# Patient Record
Sex: MALE | Race: BLACK OR AFRICAN AMERICAN | NOT HISPANIC OR LATINO | Employment: FULL TIME | URBAN - NONMETROPOLITAN AREA
[De-identification: names, ages, dates, MRNs, and addresses within clinical notes are randomized per-mention and may not be internally consistent; named-entity substitution may affect disease eponyms.]

---

## 2021-11-10 ENCOUNTER — OFFICE VISIT (OUTPATIENT)
Dept: FAMILY MEDICINE | Facility: CLINIC | Age: 46
End: 2021-11-10

## 2021-11-10 VITALS
SYSTOLIC BLOOD PRESSURE: 137 MMHG | HEART RATE: 69 BPM | WEIGHT: 213 LBS | RESPIRATION RATE: 16 BRPM | TEMPERATURE: 98 F | DIASTOLIC BLOOD PRESSURE: 74 MMHG | OXYGEN SATURATION: 99 % | HEIGHT: 71 IN | BODY MASS INDEX: 29.82 KG/M2

## 2021-11-10 DIAGNOSIS — R05.9 COUGH: Primary | ICD-10-CM

## 2021-11-10 PROCEDURE — 99203 OFFICE O/P NEW LOW 30 MIN: CPT | Mod: ,,, | Performed by: NURSE PRACTITIONER

## 2021-11-10 PROCEDURE — 99203 PR OFFICE/OUTPT VISIT, NEW, LEVL III, 30-44 MIN: ICD-10-PCS | Mod: ,,, | Performed by: NURSE PRACTITIONER

## 2021-11-10 RX ORDER — BENZONATATE 100 MG/1
100 CAPSULE ORAL 3 TIMES DAILY PRN
Qty: 90 CAPSULE | Refills: 2 | Status: SHIPPED | OUTPATIENT
Start: 2021-11-10 | End: 2021-11-20

## 2021-11-10 RX ORDER — ALBUTEROL SULFATE 90 UG/1
2 AEROSOL, METERED RESPIRATORY (INHALATION) EVERY 6 HOURS PRN
Qty: 18 G | Refills: 0 | Status: SHIPPED | OUTPATIENT
Start: 2021-11-10 | End: 2022-11-10

## 2024-09-25 ENCOUNTER — OFFICE VISIT (OUTPATIENT)
Dept: FAMILY MEDICINE | Facility: CLINIC | Age: 49
End: 2024-09-25

## 2024-09-25 VITALS
HEART RATE: 52 BPM | BODY MASS INDEX: 32.9 KG/M2 | WEIGHT: 235 LBS | RESPIRATION RATE: 18 BRPM | TEMPERATURE: 99 F | OXYGEN SATURATION: 98 % | HEIGHT: 71 IN | DIASTOLIC BLOOD PRESSURE: 92 MMHG | SYSTOLIC BLOOD PRESSURE: 141 MMHG

## 2024-09-25 DIAGNOSIS — R03.0 ELEVATED BLOOD PRESSURE READING WITHOUT DIAGNOSIS OF HYPERTENSION: ICD-10-CM

## 2024-09-25 DIAGNOSIS — Z72.51 HIGH RISK HETEROSEXUAL BEHAVIOR: Primary | ICD-10-CM

## 2024-09-25 DIAGNOSIS — Z20.2 EXPOSURE TO TRICHOMONAS: ICD-10-CM

## 2024-09-25 PROCEDURE — 99214 OFFICE O/P EST MOD 30 MIN: CPT | Mod: ,,, | Performed by: NURSE PRACTITIONER

## 2024-09-25 RX ORDER — METRONIDAZOLE 500 MG/1
500 TABLET ORAL EVERY 8 HOURS
Qty: 21 TABLET | Refills: 0 | Status: SHIPPED | OUTPATIENT
Start: 2024-09-25

## 2024-09-25 NOTE — PROGRESS NOTES
Jeannie Dumont DNP, FNP    [address] [place] [address]     PATIENT NAME: Valentin Guevara  : 1975  DATE: 24  MRN: [id number]      Billing Provider: Jeannie Dumont DNP, FNP  Level of Service:   Patient PCP Information       Provider PCP Type    [facility]            Reason for Visit / Chief Complaint: Exposure to STD (He says he has had a sexual partner that has been diagnosed recently with Trich and he says he needs treatment.  He denies symptoms)       Update PCP  Update Chief Complaint         History of Present Illness / Problem Focused Workflow     Valentin Guevara presents to the clinic with Exposure to STD (He says he has had a sexual partner that has been diagnosed recently with Trich and he says he needs treatment.  He denies symptoms)     Exposure to STD  Pertinent negatives include no abdominal pain, chest pain, chills, constipation, coughing, diarrhea, dysuria, fever, headaches, nausea, rash, shortness of breath, sore throat or vomiting.       Review of Systems     Review of Systems   Constitutional:  Negative for activity change, appetite change, chills, fatigue and fever.   HENT:  Negative for nasal congestion, ear pain, hearing loss, postnasal drip and sore throat.    Respiratory:  Negative for cough, chest tightness, shortness of breath and wheezing.    Cardiovascular:  Negative for chest pain, palpitations, leg swelling and claudication.   Gastrointestinal:  Negative for abdominal pain, change in bowel habit, constipation, diarrhea, nausea and vomiting.   Genitourinary:  Negative for dysuria.   Musculoskeletal:  Negative for arthralgias, back pain, gait problem and myalgias.   Integumentary:  Negative for rash.   Neurological:  Negative for weakness and headaches.   Psychiatric/Behavioral:  Negative for suicidal ideas. The patient is not nervous/anxious.         Medical / Social / Family History   History reviewed. No pertinent  "past medical history.    Past Surgical History:   Procedure Laterality Date    NO PAST SURGERIES         Social History  Mr. Valentin Guevara  reports that he has been smoking cigarettes. He started smoking about 27 years ago. He has a 6.9 pack-year smoking history. He has never been exposed to tobacco smoke. He has never used smokeless tobacco. He reports current alcohol use of about 6.0 standard drinks of alcohol per week. He reports that he does not use drugs.    Family History  Mr. Valentin Guevara's family history is not on file.    Medications and Allergies     Medications  No outpatient medications have been marked as taking for the 9/25/24 encounter (Office Visit) with Jeannie Dumont, RADHA, FNP.       Allergies  Review of patient's allergies indicates:  No Known Allergies    Physical Examination     Vitals:    09/25/24 1331 09/25/24 1333 09/25/24 1350   BP: (!) 152/92 (!) 136/92 (!) 141/92  Comment: pat to monitor at home and report prn   BP Location: Left arm Right arm Right arm   Patient Position: Sitting Sitting Sitting   BP Method: Large (Automatic) Large (Automatic) Large (Automatic)   Pulse: (!) 52     Resp: 18     Temp: 98.8 °F (37.1 °C)     TempSrc: Oral     SpO2: 98%     Weight: 106.6 kg (235 lb)     Height: 5' 11" (1.803 m)       Physical Exam     Assessment and Plan (including Health Maintenance)      Problem List  Smart Sets  Document Outside HM   :    Plan:         Health Maintenance Due   Topic Date Due    Hepatitis C Screening  Never done    Lipid Panel  Never done    Pneumococcal Vaccines (Age 0-64) (1 of 2 - PCV) Never done    HIV Screening  Never done    TETANUS VACCINE  Never done    Hemoglobin A1c (Diabetic Prevention Screening)  Never done    Colorectal Cancer Screening  Never done    Shingles Vaccine (1 of 2) Never done    COVID-19 Vaccine (3 - 2023-24 season) 09/01/2024       Problem List Items Addressed This Visit    None  Visit Diagnoses       High risk heterosexual behavior    -  Primary "    Relevant Medications    metroNIDAZOLE (FLAGYL) 500 MG tablet    Exposure to trichomonas        Relevant Medications    metroNIDAZOLE (FLAGYL) 500 MG tablet    Elevated blood pressure reading without diagnosis of hypertension              High risk heterosexual behavior  -     metroNIDAZOLE (FLAGYL) 500 MG tablet; Take 1 tablet (500 mg total) by mouth every 8 (eight) hours.  Dispense: 21 tablet; Refill: 0    Exposure to trichomonas  -     metroNIDAZOLE (FLAGYL) 500 MG tablet; Take 1 tablet (500 mg total) by mouth every 8 (eight) hours.  Dispense: 21 tablet; Refill: 0    Elevated blood pressure reading without diagnosis of hypertension       The patient has no Health Maintenance topics of status Not Due    Procedures     No future appointments.     Follow up if symptoms worsen or fail to improve.     Signature:  Jeannie Dumont DNP, FNP  [address] [place] [address]  Phone #: [phone]  Fax #: [phone]    Date of encounter: 9/25/24    Patient Instructions   Monitor blood pressure for 2 weeks. Make sure that you have empty bladder, resting for 5 minutes, and feet flat on the floor.  Follow up with primary care provider if BP remains > 140/90. Complete all antibiotics. Avoid alcohol while taking Flagyl.

## 2024-09-25 NOTE — PATIENT INSTRUCTIONS
Monitor blood pressure for 2 weeks. Make sure that you have empty bladder, resting for 5 minutes, and feet flat on the floor.  Follow up with primary care provider if BP remains > 140/90. Complete all antibiotics. Avoid alcohol while taking Flagyl.